# Patient Record
Sex: MALE | Race: ASIAN | NOT HISPANIC OR LATINO | URBAN - METROPOLITAN AREA
[De-identification: names, ages, dates, MRNs, and addresses within clinical notes are randomized per-mention and may not be internally consistent; named-entity substitution may affect disease eponyms.]

---

## 2019-04-20 ENCOUNTER — INPATIENT (INPATIENT)
Facility: HOSPITAL | Age: 64
LOS: 0 days | Discharge: LEFT BEFORE DISCHARGE | DRG: 313 | End: 2019-04-21
Attending: STUDENT IN AN ORGANIZED HEALTH CARE EDUCATION/TRAINING PROGRAM | Admitting: STUDENT IN AN ORGANIZED HEALTH CARE EDUCATION/TRAINING PROGRAM
Payer: COMMERCIAL

## 2019-04-20 VITALS
WEIGHT: 175.05 LBS | SYSTOLIC BLOOD PRESSURE: 139 MMHG | TEMPERATURE: 98 F | OXYGEN SATURATION: 96 % | DIASTOLIC BLOOD PRESSURE: 84 MMHG | RESPIRATION RATE: 18 BRPM | HEART RATE: 89 BPM

## 2019-04-20 LAB
ALBUMIN SERPL ELPH-MCNC: 4.1 G/DL — SIGNIFICANT CHANGE UP (ref 3.3–5)
ALP SERPL-CCNC: 76 U/L — SIGNIFICANT CHANGE UP (ref 40–120)
ALT FLD-CCNC: 42 U/L — SIGNIFICANT CHANGE UP (ref 10–45)
ANION GAP SERPL CALC-SCNC: 11 MMOL/L — SIGNIFICANT CHANGE UP (ref 5–17)
APTT BLD: 27.6 SEC — SIGNIFICANT CHANGE UP (ref 27.5–36.3)
AST SERPL-CCNC: 23 U/L — SIGNIFICANT CHANGE UP (ref 10–40)
BASOPHILS # BLD AUTO: 0.1 K/UL — SIGNIFICANT CHANGE UP (ref 0–0.2)
BASOPHILS NFR BLD AUTO: 1.3 % — SIGNIFICANT CHANGE UP (ref 0–2)
BILIRUB SERPL-MCNC: 0.3 MG/DL — SIGNIFICANT CHANGE UP (ref 0.2–1.2)
BUN SERPL-MCNC: 16 MG/DL — SIGNIFICANT CHANGE UP (ref 7–23)
CALCIUM SERPL-MCNC: 9.2 MG/DL — SIGNIFICANT CHANGE UP (ref 8.4–10.5)
CHLORIDE SERPL-SCNC: 102 MMOL/L — SIGNIFICANT CHANGE UP (ref 96–108)
CO2 SERPL-SCNC: 26 MMOL/L — SIGNIFICANT CHANGE UP (ref 22–31)
CREAT SERPL-MCNC: 0.71 MG/DL — SIGNIFICANT CHANGE UP (ref 0.5–1.3)
EOSINOPHIL # BLD AUTO: 0.3 K/UL — SIGNIFICANT CHANGE UP (ref 0–0.5)
EOSINOPHIL NFR BLD AUTO: 2.9 % — SIGNIFICANT CHANGE UP (ref 0–6)
GLUCOSE SERPL-MCNC: 268 MG/DL — HIGH (ref 70–99)
HCT VFR BLD CALC: 45.8 % — SIGNIFICANT CHANGE UP (ref 39–50)
HGB BLD-MCNC: 15.5 G/DL — SIGNIFICANT CHANGE UP (ref 13–17)
INR BLD: 1.01 RATIO — SIGNIFICANT CHANGE UP (ref 0.88–1.16)
LYMPHOCYTES # BLD AUTO: 3.6 K/UL — HIGH (ref 1–3.3)
LYMPHOCYTES # BLD AUTO: 39.2 % — SIGNIFICANT CHANGE UP (ref 13–44)
MAGNESIUM SERPL-MCNC: 1.7 MG/DL — SIGNIFICANT CHANGE UP (ref 1.6–2.6)
MCHC RBC-ENTMCNC: 31.6 PG — SIGNIFICANT CHANGE UP (ref 27–34)
MCHC RBC-ENTMCNC: 33.8 GM/DL — SIGNIFICANT CHANGE UP (ref 32–36)
MCV RBC AUTO: 93.3 FL — SIGNIFICANT CHANGE UP (ref 80–100)
MONOCYTES # BLD AUTO: 0.6 K/UL — SIGNIFICANT CHANGE UP (ref 0–0.9)
MONOCYTES NFR BLD AUTO: 6.6 % — SIGNIFICANT CHANGE UP (ref 2–14)
NEUTROPHILS # BLD AUTO: 4.6 K/UL — SIGNIFICANT CHANGE UP (ref 1.8–7.4)
NEUTROPHILS NFR BLD AUTO: 50 % — SIGNIFICANT CHANGE UP (ref 43–77)
PHOSPHATE SERPL-MCNC: 2.9 MG/DL — SIGNIFICANT CHANGE UP (ref 2.5–4.5)
PLATELET # BLD AUTO: 188 K/UL — SIGNIFICANT CHANGE UP (ref 150–400)
POTASSIUM SERPL-MCNC: 4.2 MMOL/L — SIGNIFICANT CHANGE UP (ref 3.5–5.3)
POTASSIUM SERPL-SCNC: 4.2 MMOL/L — SIGNIFICANT CHANGE UP (ref 3.5–5.3)
PROT SERPL-MCNC: 7.1 G/DL — SIGNIFICANT CHANGE UP (ref 6–8.3)
PROTHROM AB SERPL-ACNC: 11.6 SEC — SIGNIFICANT CHANGE UP (ref 10–12.9)
RBC # BLD: 4.9 M/UL — SIGNIFICANT CHANGE UP (ref 4.2–5.8)
RBC # FLD: 13 % — SIGNIFICANT CHANGE UP (ref 10.3–14.5)
SODIUM SERPL-SCNC: 139 MMOL/L — SIGNIFICANT CHANGE UP (ref 135–145)
TROPONIN T, HIGH SENSITIVITY RESULT: <6 NG/L — SIGNIFICANT CHANGE UP (ref 0–51)
WBC # BLD: 9.2 K/UL — SIGNIFICANT CHANGE UP (ref 3.8–10.5)
WBC # FLD AUTO: 9.2 K/UL — SIGNIFICANT CHANGE UP (ref 3.8–10.5)

## 2019-04-20 PROCEDURE — 71045 X-RAY EXAM CHEST 1 VIEW: CPT | Mod: 26

## 2019-04-20 PROCEDURE — 99285 EMERGENCY DEPT VISIT HI MDM: CPT | Mod: 25

## 2019-04-20 PROCEDURE — 93010 ELECTROCARDIOGRAM REPORT: CPT

## 2019-04-20 RX ORDER — ASPIRIN/CALCIUM CARB/MAGNESIUM 324 MG
324 TABLET ORAL ONCE
Qty: 0 | Refills: 0 | Status: COMPLETED | OUTPATIENT
Start: 2019-04-20 | End: 2019-04-20

## 2019-04-20 RX ADMIN — Medication 324 MILLIGRAM(S): at 22:43

## 2019-04-20 NOTE — ED PROVIDER NOTE - ATTENDING CONTRIBUTION TO CARE
Dr. Bhatt : I have personally seen and examined this patient at the bedside. I have fully participated in the care of this patient. I have reviewed all pertinent clinical information, including history, physical exam, plan and the Resident's note and agree except as noted.       64yo M w/ HTN, HLD, DM2 pw left sided cp describes as pressure with radiation to left arm today since 2pm. notes yesterday had pain to left arm only. some numbness/tingling mild lightheadedness. notes cp constant.   Denies f/c/n/v//sob/palpitations/cough/abd.pain/d/c/dysuria/hematuria. no sick contacts/recent travel. no hx of dvt/pe. +smoker daily 2packs/day. notes does not have a cardiologist (echo and stress more than 5 years ago)    PE:  head; atraumatic normocephalic  eyes: perrla  Heart: rrr s1s2  lungs: ctab  abd: soft, nt nd + bs no rebound/guarding no cva ttp  le: no swelling no calf ttp  back: no midline cervical/thoracic/lumbar ttp    -->concern for acs; ekg shows low voltage will fu labs cxr--admit

## 2019-04-20 NOTE — ED PROVIDER NOTE - CLINICAL SUMMARY MEDICAL DECISION MAKING FREE TEXT BOX
Chest pain high risk for ACS, ECG w/ subtle inferior changes but not STEMI, though still having active symptoms. Presentation not suggestive of PE or dissection, exam not suggestive of GI or MSK cause. Plan for serial enzymes, aspirin, CXR. Will need ultimately need ischemic work up.

## 2019-04-20 NOTE — ED ADULT NURSE NOTE - OBJECTIVE STATEMENT
63 y.o M A&Ox3 with PMH of DM, HLD, HTN, presents to the ED c.o L sided CP. pt. reports symptoms presented spontaneously today at rest. Pt. reports pain is non radiating and constant. States pain level is 3/10 at this time. Pt. reports he smokes 2 packs of cigarettes daily. Pt. does not appear to be in any acute distress - nonlabored breathing noted and pt. speaking in full coherent sentences. pt. also endorses numbness/tingling to fingers. Denies lightheadedness, HA, n/v/d, cough, lower extremity edema, SOB, urinary symptoms. Pt. takes 81 mg of baby asp daily, did not take today. Denies recent travel. EKG done and pt. placed on CM. Safety and comfort provided. Family at bedside.

## 2019-04-20 NOTE — ED PROVIDER NOTE - OBJECTIVE STATEMENT
63yom w/ HTN, HLD, DM2 had a sudden onset of midsternal and left sided chest heaviness with pain radiating down the left arm, onset at rest, constant since onset, associated paresthesias in the left hand and lightheadedness. Seen at  initially and referred to ED for further evaluation. Still complains of chest discomfort now. Had some left arm pain yesterday that self resolved but no chest pain at that time. No leg pain, swelling, hx of VTE, recent immobilization.

## 2019-04-21 ENCOUNTER — TRANSCRIPTION ENCOUNTER (OUTPATIENT)
Age: 64
End: 2019-04-21

## 2019-04-21 VITALS
HEART RATE: 69 BPM | OXYGEN SATURATION: 96 % | RESPIRATION RATE: 18 BRPM | TEMPERATURE: 98 F | DIASTOLIC BLOOD PRESSURE: 76 MMHG | SYSTOLIC BLOOD PRESSURE: 114 MMHG

## 2019-04-21 DIAGNOSIS — E11.69 TYPE 2 DIABETES MELLITUS WITH OTHER SPECIFIED COMPLICATION: ICD-10-CM

## 2019-04-21 DIAGNOSIS — R07.9 CHEST PAIN, UNSPECIFIED: ICD-10-CM

## 2019-04-21 DIAGNOSIS — I10 ESSENTIAL (PRIMARY) HYPERTENSION: ICD-10-CM

## 2019-04-21 DIAGNOSIS — E78.5 HYPERLIPIDEMIA, UNSPECIFIED: ICD-10-CM

## 2019-04-21 LAB
CK MB BLD-MCNC: 1.7 % — SIGNIFICANT CHANGE UP (ref 0–3.5)
CK MB CFR SERPL CALC: 1.7 NG/ML — SIGNIFICANT CHANGE UP (ref 0–6.7)
CK SERPL-CCNC: 103 U/L — SIGNIFICANT CHANGE UP (ref 30–200)
TROPONIN T, HIGH SENSITIVITY RESULT: <6 NG/L — SIGNIFICANT CHANGE UP (ref 0–51)
TROPONIN T, HIGH SENSITIVITY RESULT: <6 NG/L — SIGNIFICANT CHANGE UP (ref 0–51)

## 2019-04-21 PROCEDURE — 82553 CREATINE MB FRACTION: CPT

## 2019-04-21 PROCEDURE — 85610 PROTHROMBIN TIME: CPT

## 2019-04-21 PROCEDURE — 85027 COMPLETE CBC AUTOMATED: CPT

## 2019-04-21 PROCEDURE — 83735 ASSAY OF MAGNESIUM: CPT

## 2019-04-21 PROCEDURE — 99285 EMERGENCY DEPT VISIT HI MDM: CPT | Mod: 25

## 2019-04-21 PROCEDURE — 80053 COMPREHEN METABOLIC PANEL: CPT

## 2019-04-21 PROCEDURE — 93005 ELECTROCARDIOGRAM TRACING: CPT

## 2019-04-21 PROCEDURE — 82550 ASSAY OF CK (CPK): CPT

## 2019-04-21 PROCEDURE — 85730 THROMBOPLASTIN TIME PARTIAL: CPT

## 2019-04-21 PROCEDURE — 99236 HOSP IP/OBS SAME DATE HI 85: CPT

## 2019-04-21 PROCEDURE — 84484 ASSAY OF TROPONIN QUANT: CPT

## 2019-04-21 PROCEDURE — 71045 X-RAY EXAM CHEST 1 VIEW: CPT

## 2019-04-21 PROCEDURE — 84100 ASSAY OF PHOSPHORUS: CPT

## 2019-04-21 RX ORDER — DEXTROSE 50 % IN WATER 50 %
15 SYRINGE (ML) INTRAVENOUS ONCE
Qty: 0 | Refills: 0 | Status: DISCONTINUED | OUTPATIENT
Start: 2019-04-21 | End: 2019-04-21

## 2019-04-21 RX ORDER — INSULIN LISPRO 100/ML
VIAL (ML) SUBCUTANEOUS
Qty: 0 | Refills: 0 | Status: DISCONTINUED | OUTPATIENT
Start: 2019-04-21 | End: 2019-04-21

## 2019-04-21 RX ORDER — INSULIN LISPRO 100/ML
VIAL (ML) SUBCUTANEOUS AT BEDTIME
Qty: 0 | Refills: 0 | Status: DISCONTINUED | OUTPATIENT
Start: 2019-04-21 | End: 2019-04-21

## 2019-04-21 RX ORDER — ASPIRIN/CALCIUM CARB/MAGNESIUM 324 MG
81 TABLET ORAL DAILY
Qty: 0 | Refills: 0 | Status: DISCONTINUED | OUTPATIENT
Start: 2019-04-21 | End: 2019-04-21

## 2019-04-21 RX ORDER — ATORVASTATIN CALCIUM 80 MG/1
80 TABLET, FILM COATED ORAL AT BEDTIME
Qty: 0 | Refills: 0 | Status: DISCONTINUED | OUTPATIENT
Start: 2019-04-21 | End: 2019-04-21

## 2019-04-21 RX ORDER — INFLUENZA VIRUS VACCINE 15; 15; 15; 15 UG/.5ML; UG/.5ML; UG/.5ML; UG/.5ML
0.5 SUSPENSION INTRAMUSCULAR ONCE
Qty: 0 | Refills: 0 | Status: DISCONTINUED | OUTPATIENT
Start: 2019-04-21 | End: 2019-04-21

## 2019-04-21 RX ORDER — ENOXAPARIN SODIUM 100 MG/ML
40 INJECTION SUBCUTANEOUS EVERY 24 HOURS
Qty: 0 | Refills: 0 | Status: DISCONTINUED | OUTPATIENT
Start: 2019-04-21 | End: 2019-04-21

## 2019-04-21 RX ORDER — GLUCAGON INJECTION, SOLUTION 0.5 MG/.1ML
1 INJECTION, SOLUTION SUBCUTANEOUS ONCE
Qty: 0 | Refills: 0 | Status: DISCONTINUED | OUTPATIENT
Start: 2019-04-21 | End: 2019-04-21

## 2019-04-21 RX ORDER — SODIUM CHLORIDE 9 MG/ML
1000 INJECTION, SOLUTION INTRAVENOUS
Qty: 0 | Refills: 0 | Status: DISCONTINUED | OUTPATIENT
Start: 2019-04-21 | End: 2019-04-21

## 2019-04-21 RX ORDER — DEXTROSE 50 % IN WATER 50 %
25 SYRINGE (ML) INTRAVENOUS ONCE
Qty: 0 | Refills: 0 | Status: DISCONTINUED | OUTPATIENT
Start: 2019-04-21 | End: 2019-04-21

## 2019-04-21 RX ORDER — DEXTROSE 50 % IN WATER 50 %
12.5 SYRINGE (ML) INTRAVENOUS ONCE
Qty: 0 | Refills: 0 | Status: DISCONTINUED | OUTPATIENT
Start: 2019-04-21 | End: 2019-04-21

## 2019-04-21 NOTE — H&P ADULT - ASSESSMENT
63yoM, current smoker, w/ PMHx significant for HTN, HLD, NIDDM, presents from home w/ one day of sudden onset midsternal chest pain described as heaviness, radiating to left chest and down left arm, present at rest, constant since onset, associated w/ left hand paresthesias and lightheadedness.

## 2019-04-21 NOTE — H&P ADULT - NEGATIVE NEUROLOGICAL SYMPTOMS
no loss of consciousness/no hemiparesis/no headache/no syncope/no difficulty walking/no confusion/no transient paralysis

## 2019-04-21 NOTE — H&P ADULT - NSICDXPASTMEDICALHX_GEN_ALL_CORE_FT
PAST MEDICAL HISTORY:  DM2 (diabetes mellitus, type 2)     HLD (hyperlipidemia)     HTN (hypertension)

## 2019-04-21 NOTE — H&P ADULT - HISTORY OF PRESENT ILLNESS
63yoM, current smoker, w/ PMHx significant for HTN, HLD, NIDDM, presents from home w/ one day of sudden onset midsternal chest pain described as heaviness, radiating to left chest and down left arm, present at rest, constant since onset, associated w/ left hand paresthesias and lightheadedness. He presenting to urgent care initially, and was then referred to the ED for further evaluation. In the ED, patient continued to complain of chest discomfort but without left arm pain. He denied associated diaphoresis, nausea/vomiting, SOB/GRANDE, LE edema, orthopnea, PND, palpitations, HA, vision changes, focal neurologic deficits, fever/chills, sick contacts, recent travel, periods of immobilization, calf pain, abd pain, d/c, or have urinary complaints. Currently, this morning, patient no longer has chest pain or any symptoms, and is able to ambulate around emmanuel without CP, or CP equivalents.    ED Presenting Vitals: 97.9F, 89bpm, 139/84, 18br/m, 96% on RA   ED Course: s/p ASA 324mg PO x1, ISS

## 2019-04-21 NOTE — CONSULT NOTE ADULT - ASSESSMENT
63 year old male current smoker, HTN, HLD, NIDDM, presents from home w/ one day of sudden onset midsternal chest pain; atypical.  Trop neg x 2  No ECG changes, no arrhythmias on tele    #Chest pain  -- would give asa 81 mg, atorvastatin 80 mg  -- check TTE  -- check nuclear stress test  -- will discuss with attending feasibility of discharge with outpatient follow up given patients preferences  -- monitor on tele    Darnell Blakely MD

## 2019-04-21 NOTE — CHART NOTE - NSCHARTNOTEFT_GEN_A_CORE
Medicine NP([id number])    pt. requested earlier today to leave AMA. Pt seen by Attending & Cardiology prior request.  NP s/w pt prior his leaving; explained he may become seriously ill & possibly die if he left hospital.   Pt stated he would "take the chance & see PMD ASAP"  Pt refused to wait for d/c paperwork  AMA form signed by pt. & placed on chart  Dr. Brody informed pt. AMA

## 2019-04-21 NOTE — CONSULT NOTE ADULT - SUBJECTIVE AND OBJECTIVE BOX
Patient seen and evaluated @ 10:50 AM  Chief Complaint: Chest discomfort    HPI:  63 year old male current smoker, HTN, HLD, NIDDM, presents from home w/ one day of sudden onset midsternal chest pain described as heaviness, radiating to left chest and down left arm, present at rest, constant since onset, associated w/ left hand paresthesias and lightheadedness. He presenting to urgent care initially, and was then referred to the ED for further evaluation. In the ED, patient continued to complain of chest discomfort but without left arm pain. He denied associated diaphoresis, nausea/vomiting, SOB/GRANDE, LE edema, orthopnea, PND, palpitations, HA, vision changes, focal neurologic deficits, fever/chills, sick contacts, recent travel, periods of immobilization, calf pain, abd pain, d/c, or have urinary complaints. Currently, this morning, patient no longer has chest pain or any symptoms, and is able to ambulate around emmanuel without CP, or CP equivalents.    Patient reports resolution of chest discomfort yesterday at 9 PM. Chest discomfort lasted the whole day, reportedly as 1/10 and difficult to describe. No SOB, LOC, dizziness, palpitations. No radiation.     No prior cardiac hx, no family hx. No prior occurrences of chest discomfort. No exertional symptoms.    Patient wants to be discharged to follow up as outpatient.    PMH:   DM2 (diabetes mellitus, type 2)  HLD (hyperlipidemia)  HTN (hypertension)    PSH:   No significant past surgical history    Medications:   dextrose 40% Gel 15 Gram(s) Oral once PRN  dextrose 5%. 1000 milliLiter(s) IV Continuous <Continuous>  dextrose 50% Injectable 12.5 Gram(s) IV Push once  dextrose 50% Injectable 25 Gram(s) IV Push once  dextrose 50% Injectable 25 Gram(s) IV Push once  enoxaparin Injectable 40 milliGRAM(s) SubCutaneous every 24 hours  glucagon  Injectable 1 milliGRAM(s) IntraMuscular once PRN  insulin lispro (HumaLOG) corrective regimen sliding scale   SubCutaneous three times a day before meals  insulin lispro (HumaLOG) corrective regimen sliding scale   SubCutaneous at bedtime    Allergies:  No Known Allergies    FAMILY HISTORY: NC    Social History:  + tobacco user    Review of Systems:  Constitutional: [ ] Fever [ ] Chills [ ] Fatigue [ ] Weight change   HEENT: [ ] Blurred vision [ ] Eye Pain [ ] Headache [ ] Runny nose [ ] Sore Throat   Respiratory: [ ] Cough [ ] Wheezing [ ] Shortness of breath  Cardiovascular: [ ] Chest Pain [ ] Palpitations [ ] GRANDE [ ] PND [ ] Orthopnea  Gastrointestinal: [ ] Abdominal Pain [ ] Diarrhea [ ] Constipation [ ] Hemorrhoids [ ] Nausea [ ] Vomiting  Genitourinary: [ ] Nocturia [ ] Dysuria [ ] Incontinence  Extremities: [ ] Swelling [ ] Joint Pain  Neurologic: [ ] Focal deficit [ ] Paresthesias [ ] Syncope  Lymphatic: [ ] Swelling [ ] Lymphadenopathy   Skin: [ ] Rash [ ] Ecchymoses [ ] Wounds [ ] Lesions  Psychiatry: [ ] Depression [ ] Suicidal/Homicidal Ideation [ ] Anxiety [ ] Sleep Disturbances  [x] 10 point review of systems is otherwise negative except as mentioned above            [ ]Unable to obtain    Physical Exam:  T(C): 36.7 (04-21-19 @ 05:30), Max: 36.7 (04-20-19 @ 22:39)  HR: 69 (04-21-19 @ 05:30) (69 - 89)  BP: 114/76 (04-21-19 @ 05:30) (101/67 - 139/84)  RR: 18 (04-21-19 @ 05:30) (18 - 23)  SpO2: 96% (04-21-19 @ 05:30) (95% - 98%)  Wt(kg): --    Daily     Daily     Appearance: NAD  Eyes: PERRL, EOMI  HENT: Normal oral mus[place], NC/AT  Cardiovascular: normal S1 and S2, RRR, no m/r/g, no edema, elevated JVP  Respiratory: Clear to auscultation bilaterally  Gastrointestinal: Soft, non-tender, non-distended, BS+  Musculoskeletal: No clubbing, no joint deformity   Neurologic: Non-focal  Lymphatic: No lymphadenopathy  Psychiatry: AAOx3, mood & affect appropriate  Skin: No rashes, no ecchymoses, no cyanosis    Cardiovascular Diagnostic Testing:  ECG: NSR, low voltage limb leads    Echo: none    Stress Testing: none    Cath: none    Interpretation of Telemetry: NSR    Imaging:   CXR  IMPRESSION:    Clear lungs.    Labs:                        15.5   9.2   )-----------( 188      ( 20 Apr 2019 22:57 )             45.8     04-20    139  |  102  |  16  ----------------------------<  268<H>  4.2   |  26  |  0.71    Ca    9.2      20 Apr 2019 22:57  Phos  2.9     04-20  Mg     1.7     04-20    TPro  7.1  /  Alb  4.1  /  TBili  0.3  /  DBili  x   /  AST  23  /  ALT  42  /  AlkPhos  76  04-20    PT/INR - ( 20 Apr 2019 22:57 )   PT: 11.6 sec;   INR: 1.01 ratio         PTT - ( 20 Apr 2019 22:57 )  PTT:27.6 sec  CARDIAC MARKERS ( 21 Apr 2019 10:07 )  x     / x     / 103 U/L / x     / 1.7 ng/mL

## 2019-04-21 NOTE — H&P ADULT - NSHPLABSRESULTS_GEN_ALL_CORE
Labs and imaging data obtained personally reviewed. Pertinent findings include:  - WNL CBC, BMP, LFTs, coags, except for hyperglycemia = 268  - hsTrop < 6 x2  - ECG: NSR, TW flattening in II, III, aVF  - CXR as preliminarily reported: clear lungs

## 2019-04-21 NOTE — PATIENT PROFILE ADULT - NSPROEDAREADYLEARN_GEN_A_NUR
Pt lives with her family 4 [address]IE[address] and a flight of stairs + railing. Pt has a tub Pt owns a RW and SAC none

## 2019-04-21 NOTE — DISCHARGE NOTE PROVIDER - HOSPITAL COURSE
History of Present Illness:     63yoM, current smoker, w/ PMHx significant for HTN, HLD, NIDDM, presents from home w/ one day of sudden onset midsternal chest pain described as heaviness, radiating to left chest and down left arm, present at rest, constant since onset, associated w/ left hand paresthesias and lightheadedness. He presenting to urgent care initially, and was then referred to the ED for further evaluation. In the ED, patient continued to complain of chest discomfort but without left arm pain. He denied associated diaphoresis, nausea/vomiting, SOB/GRANDE, LE edema, orthopnea, PND, palpitations, HA, vision changes, focal neurologic deficits, fever/chills, sick contacts, recent travel, periods of immobilization, calf pain, abd pain, d/c, or have urinary complaints. Currently, this morning, patient no longer has chest pain or any symptoms, and is able to ambulate around emmanuel without CP, or CP equivalents.    Pt evaluated by [facility] advised TTE, NST, ASA, Lipitor    Pt. left AMA; form signed on chart. See today's note. Pt stated he will see PMDCardio. ASAP    Pt would not wait for d/c paperwork completion.  Dr. Broyd notified.

## 2019-04-21 NOTE — H&P ADULT - NEGATIVE CARDIOVASCULAR SYMPTOMS
no orthopnea/no paroxysmal nocturnal dyspnea/no palpitations/no dyspnea on exertion/no peripheral edema

## 2019-04-21 NOTE — H&P ADULT - NSHPSOCIALHISTORY_GEN_ALL_CORE
Patient lives with his family, able to participate in ADLs/IADLs; current every day smoker, 2 packs a day.

## 2019-04-21 NOTE — H&P ADULT - NEGATIVE MUSCULOSKELETAL SYMPTOMS
no leg pain R/no muscle cramps/no back pain/no leg pain L/no muscle weakness/no myalgia/no stiffness/no neck pain/no arm pain R

## 2019-04-21 NOTE — H&P ADULT - PROBLEM SELECTOR PLAN 1
- now resolved, will trend trops  - cardiology consulted for further workup given multiple risk factors including active smoker, diabetes, hyperlipidemia  - monitor on tele  - continue ASA, statin

## 2023-02-16 NOTE — H&P ADULT - NSHPPHYSICALEXAM_GEN_ALL_CORE
Vital Signs Last 24 Hrs  T(F): 98.1 (21 Apr 2019 05:30), Max: 98.1 (21 Apr 2019 04:59)  HR: 69 (21 Apr 2019 05:30) (69 - 89)  BP: 114/76 (21 Apr 2019 05:30) (101/67 - 139/84)  RR: 18 (21 Apr 2019 05:30) (18 - 23)  SpO2: 96% (21 Apr 2019 05:30) (95% - 98%)    GEN: middle aged man, sitting up in stretcher/ambulating in NAD  PSYCH: A&Ox3, mood and affect appear appropriate   SKIN: intact, no e/o rash  NEURO: no focal neurologic deficits appreciated  EYES: PERRL, anicteric  HEAD: NC, AT  NECK: supple  RESPI: no accessory muscle use, B/L air entry, CTAB   CARDIO: regular rate/rhythm, no LE edema B/L  ABD: soft, NT, ND, +BS  EXT: patient able to move all extremities spontaneously  VASC: peripheral pulses palpated